# Patient Record
Sex: FEMALE | Race: WHITE | Employment: FULL TIME | URBAN - METROPOLITAN AREA
[De-identification: names, ages, dates, MRNs, and addresses within clinical notes are randomized per-mention and may not be internally consistent; named-entity substitution may affect disease eponyms.]

---

## 2018-07-11 ENCOUNTER — OFFICE VISIT (OUTPATIENT)
Dept: FAMILY MEDICINE CLINIC | Facility: CLINIC | Age: 23
End: 2018-07-11

## 2018-07-11 VITALS
OXYGEN SATURATION: 98 % | RESPIRATION RATE: 16 BRPM | HEART RATE: 79 BPM | WEIGHT: 125 LBS | HEIGHT: 61 IN | TEMPERATURE: 99 F | DIASTOLIC BLOOD PRESSURE: 76 MMHG | SYSTOLIC BLOOD PRESSURE: 114 MMHG | BODY MASS INDEX: 23.6 KG/M2

## 2018-07-11 DIAGNOSIS — Z00.00 NORMAL PHYSICAL EXAM: Primary | ICD-10-CM

## 2018-07-11 DIAGNOSIS — R31.9 HEMATURIA, UNSPECIFIED TYPE: ICD-10-CM

## 2018-07-11 LAB
APPEARANCE: CLEAR
MULTISTIX LOT#: NORMAL NUMERIC
PH, URINE: 6.5 (ref 4.5–8)
SPECIFIC GRAVITY: 1.02 (ref 1–1.03)
URINE-COLOR: YELLOW
UROBILINOGEN,SEMI-QN: 0.2 MG/DL (ref 0–1.9)

## 2018-07-11 PROCEDURE — 99202 OFFICE O/P NEW SF 15 MIN: CPT | Performed by: NURSE PRACTITIONER

## 2018-07-11 PROCEDURE — 81003 URINALYSIS AUTO W/O SCOPE: CPT | Performed by: NURSE PRACTITIONER

## 2018-07-11 RX ORDER — LEVONORGESTREL AND ETHINYL ESTRADIOL AND ETHINYL ESTRADIOL 150-30(84)
KIT ORAL
Refills: 0 | COMMUNITY
Start: 2018-07-09 | End: 2019-10-04 | Stop reason: ALTCHOICE

## 2018-07-11 NOTE — PROGRESS NOTES
CHIEF COMPLAINT:   Patient presents with:  UTI: blood in urine x 1 day     HPI:   Regina Rice is a 23year old female who presents with symptoms of hematuria for last 1 days.  Pt is on her period and saw more blood than normal. She was on birth contr Size: adult)   Pulse 79   Temp 98.6 °F (37 °C) (Oral)   Resp 16   Ht 61\"   Wt 125 lb   LMP 07/05/2018   SpO2 98%   BMI 23.62 kg/m²   GENERAL: well developed, well nourished,in no apparent distress  CARDIO: RRR, no murmurs  LUNGS: clear to ausculation bila

## 2018-07-12 NOTE — PATIENT INSTRUCTIONS
Blood in the Urine    Blood in the urine (hematuria) has many possible causes. If it occurs after an injury (such as a car accident or fall), it is most often a sign of bruising to the kidney or bladder.  Common causes of blood in the urine include urinar · Fever of 100.4ºF (38ºC) or higher, or as directed by your healthcare provider  · Repeated vomiting  · Bleeding from the nose or gums or easy bruising  Date Last Reviewed: 9/1/2016  © 0495-4401 [employer] [address].  [address] [place], [place] Both types of hematuria can have the same causes. Neither one is necessarily more serious than the other. With either type, you may have other symptoms, such as pain, pressure, or burning when you urinate, abdominal pain, or back pain.  Or, you may not have

## 2019-10-04 PROBLEM — G43.109 MIGRAINE WITH AURA: Status: ACTIVE | Noted: 2019-10-04

## 2019-10-04 PROBLEM — Z82.49 FAMILY HISTORY OF BRAIN ANEURYSM: Status: ACTIVE | Noted: 2019-10-04

## 2019-10-04 PROBLEM — G43.009 MIGRAINE WITHOUT AURA: Status: ACTIVE | Noted: 2019-10-04

## 2019-12-20 ENCOUNTER — OFFICE VISIT (OUTPATIENT)
Dept: FAMILY MEDICINE CLINIC | Facility: CLINIC | Age: 24
End: 2019-12-20

## 2019-12-20 VITALS
BODY MASS INDEX: 22.62 KG/M2 | HEIGHT: 61 IN | RESPIRATION RATE: 20 BRPM | OXYGEN SATURATION: 98 % | WEIGHT: 119.81 LBS | DIASTOLIC BLOOD PRESSURE: 62 MMHG | TEMPERATURE: 99 F | SYSTOLIC BLOOD PRESSURE: 98 MMHG | HEART RATE: 87 BPM

## 2019-12-20 DIAGNOSIS — R10.816 EPIGASTRIC ABDOMINAL TENDERNESS WITHOUT REBOUND TENDERNESS: Primary | ICD-10-CM

## 2019-12-20 PROCEDURE — 99213 OFFICE O/P EST LOW 20 MIN: CPT | Performed by: NURSE PRACTITIONER

## 2019-12-20 NOTE — PATIENT INSTRUCTIONS
Tips to Control Acid Reflux    To control acid reflux, you’ll need to make some basic diet and lifestyle changes. The simple steps outlined below may be all you’ll need to ease discomfort. Watch what you eat  · Avoid fatty foods and spicy foods.   · Eat · Talk to your healthcare provider if you are taking any of the following medicines. These medicines can make GERD symptoms worse:  ? Calcium channel blockers  ? Theophylline  ?  Anticholinergic medicines, such as oxybutynin and benzatropine  · Begin an exe The esophagus is a tube that carries food from the mouth to the stomach. A valve (the LES, lower esophageal sphincter) at the lower end of the esophagus prevents stomach acid from flowing upward.  When this valve doesn't work properly, stomach contents may · If your symptoms occur during sleep, use a foam wedge to elevate your upper body (not just your head.) Or, place 4\" blocks under the head of your bed. Or use 2 bed risers under your bedframe.   Medicines  If needed, medicines can help relieve the symptom © 6495-4927 [employer] [address]. [address] [facility], [address] [place]. All rights reserved. This information is not intended as a substitute for professional medical care. Always follow your healthcare professional's instructions.         What Is The following foods tend to cause problems for people diagnosed with GERD:  · Tomatoes and tomato products  · Alcohol  · Coffee  · Peppermint  · Greasy or spicy foods  Talk with your provider if you don’t understand how to make the dietary changes needed t Date Last Reviewed: 10/1/2016  © 8610-8760 [employer] [address]. [address] [facility], [address] [place]. All rights reserved. This information is not intended as a substitute for professional medical care.  Always follow your healthcare professional

## 2019-12-20 NOTE — PROGRESS NOTES
CHIEF COMPLAINT:   Patient presents with:  Abdominal Pain: left side pain,  x2days        HPI:   Sea Melgar is a 30year old female who presents for complaints of abdominal pain. Symptoms have been present for 1  days.   Began @ 24 hours ago megan (Oral)   Resp 20   Ht 61\"   Wt 119 lb 12.8 oz (54.3 kg)   LMP 11/29/2019   SpO2 98%   BMI 22.64 kg/m²   GENERAL: well developed, well nourished,in no apparent distress  SKIN: no rashes,no suspicious lesions  HEAD: atraumatic, normocephalic,   EYES: conjun

## 2020-12-04 ENCOUNTER — OFFICE VISIT (OUTPATIENT)
Dept: FAMILY MEDICINE CLINIC | Facility: CLINIC | Age: 25
End: 2020-12-04

## 2020-12-04 ENCOUNTER — LAB ENCOUNTER (OUTPATIENT)
Dept: LAB | Age: 25
End: 2020-12-04
Attending: PHYSICIAN ASSISTANT
Payer: COMMERCIAL

## 2020-12-04 VITALS
TEMPERATURE: 98 F | DIASTOLIC BLOOD PRESSURE: 68 MMHG | OXYGEN SATURATION: 97 % | HEART RATE: 86 BPM | SYSTOLIC BLOOD PRESSURE: 102 MMHG | HEIGHT: 63 IN | WEIGHT: 112 LBS | BODY MASS INDEX: 19.84 KG/M2 | RESPIRATION RATE: 22 BRPM

## 2020-12-04 DIAGNOSIS — Z30.09 COUNSELING FOR BIRTH CONTROL REGARDING INTRAUTERINE DEVICE (IUD): ICD-10-CM

## 2020-12-04 DIAGNOSIS — Z00.00 ROUTINE GENERAL MEDICAL EXAMINATION AT A HEALTH CARE FACILITY: Primary | ICD-10-CM

## 2020-12-04 DIAGNOSIS — Z13.21 ENCOUNTER FOR VITAMIN DEFICIENCY SCREENING: ICD-10-CM

## 2020-12-04 DIAGNOSIS — I67.1 BRAIN ANEURYSM: ICD-10-CM

## 2020-12-04 DIAGNOSIS — Z00.00 ROUTINE GENERAL MEDICAL EXAMINATION AT A HEALTH CARE FACILITY: ICD-10-CM

## 2020-12-04 DIAGNOSIS — R51.9 HEADACHE DISORDER: ICD-10-CM

## 2020-12-04 PROCEDURE — 36415 COLL VENOUS BLD VENIPUNCTURE: CPT

## 2020-12-04 PROCEDURE — 82306 VITAMIN D 25 HYDROXY: CPT

## 2020-12-04 PROCEDURE — 3078F DIAST BP <80 MM HG: CPT | Performed by: PHYSICIAN ASSISTANT

## 2020-12-04 PROCEDURE — 84443 ASSAY THYROID STIM HORMONE: CPT

## 2020-12-04 PROCEDURE — 85025 COMPLETE CBC W/AUTO DIFF WBC: CPT

## 2020-12-04 PROCEDURE — 80053 COMPREHEN METABOLIC PANEL: CPT

## 2020-12-04 PROCEDURE — 3074F SYST BP LT 130 MM HG: CPT | Performed by: PHYSICIAN ASSISTANT

## 2020-12-04 PROCEDURE — 3008F BODY MASS INDEX DOCD: CPT | Performed by: PHYSICIAN ASSISTANT

## 2020-12-04 PROCEDURE — 99395 PREV VISIT EST AGE 18-39: CPT | Performed by: PHYSICIAN ASSISTANT

## 2020-12-04 PROCEDURE — 80061 LIPID PANEL: CPT

## 2020-12-04 PROCEDURE — 82607 VITAMIN B-12: CPT

## 2020-12-04 NOTE — PROGRESS NOTES
HPI:    Patient ID: David Perez is a 30year old female. HPI  Patient presents today to establish care. Was previously receiving care at a private practice and her doctor unfortunately passed away.      PMHx: Brain aneurysm, migraines  PSHx: gumaro Refill   • Multiple Vitamins-Minerals (MULTI-VITAMIN/MINERALS) Oral Tab Take 1 tablet by mouth daily.      • ferrous sulfate 325 (65 FE) MG Oral Tab EC Take 325 mg by mouth daily with breakfast.       Allergies:No Known Allergies   PHYSICAL EXAM:   Physical normal.              ASSESSMENT/PLAN:   1. Routine general medical examination at a health care facility  Physical exam is unremarkable. Check fasting labs. Health maintenance issues discussed. Encouraged routine vaccines.   Advised healthy diet and regul

## 2021-02-19 ENCOUNTER — HOSPITAL ENCOUNTER (OUTPATIENT)
Dept: MRI IMAGING | Age: 26
Discharge: HOME | End: 2021-02-19
Attending: PHYSICIAN ASSISTANT
Payer: COMMERCIAL

## 2021-02-19 DIAGNOSIS — D35.2 PITUITARY MICROADENOMA (HCC): ICD-10-CM

## 2021-02-19 DIAGNOSIS — I67.1 BRAIN ANEURYSM: ICD-10-CM

## 2021-02-19 PROCEDURE — A9575 INJ GADOTERATE MEGLUMI 0.1ML: HCPCS | Performed by: PHYSICIAN ASSISTANT

## 2021-02-19 PROCEDURE — 70553 MRI BRAIN STEM W/O & W/DYE: CPT | Performed by: PHYSICIAN ASSISTANT

## 2021-03-13 DIAGNOSIS — Z23 NEED FOR VACCINATION: ICD-10-CM

## 2021-04-23 ENCOUNTER — TELEMEDICINE (OUTPATIENT)
Dept: FAMILY MEDICINE CLINIC | Facility: CLINIC | Age: 26
End: 2021-04-23

## 2021-04-23 DIAGNOSIS — N94.6 SEVERE MENSTRUAL CRAMPS: Primary | ICD-10-CM

## 2021-04-23 DIAGNOSIS — N92.0 MENORRHAGIA WITH REGULAR CYCLE: ICD-10-CM

## 2021-04-23 PROCEDURE — 99213 OFFICE O/P EST LOW 20 MIN: CPT | Performed by: PHYSICIAN ASSISTANT

## 2021-04-23 RX ORDER — LEVONORGESTREL / ETHINYL ESTRADIOL AND ETHINYL ESTRADIOL 150-30(84)
1 KIT ORAL DAILY
Qty: 84 TABLET | Refills: 3 | Status: SHIPPED | OUTPATIENT
Start: 2021-04-23 | End: 2021-07-01

## 2021-04-23 NOTE — PROGRESS NOTES
Video Visit    Mario White is a 30year old female. No chief complaint on file. HPI:   Patient presents today to discuss her options for birth control.   In the past she was under the impression she had a brain aneurysm and was told to stay off pain, joint pain, neck pain, and myalgias. NEURO: Denies numbness/tingling, weakness, and headaches        EXAM:   GENERAL: well developed, well nourished, NAD. HEENT: AT/NC. Normal lips, gums, teeth, tongue.    LUNGS: Speaking in complete sentences comfo patient was also advised of the potential privacy & security concerns related to the telehealth platform.    The patient was made aware of where to find [facility] notice of privacy practices, telehealth consent form and other related consent forms and documents

## 2021-07-01 ENCOUNTER — PATIENT MESSAGE (OUTPATIENT)
Dept: FAMILY MEDICINE CLINIC | Facility: CLINIC | Age: 26
End: 2021-07-01

## 2021-07-01 DIAGNOSIS — D36.7 DERMOID CYST OF UPPER EXTREMITY, UNSPECIFIED LATERALITY: Primary | ICD-10-CM

## 2021-07-01 DIAGNOSIS — N94.6 SEVERE MENSTRUAL CRAMPS: ICD-10-CM

## 2021-07-01 DIAGNOSIS — N92.0 MENORRHAGIA WITH REGULAR CYCLE: ICD-10-CM

## 2021-07-01 RX ORDER — LEVONORGESTREL / ETHINYL ESTRADIOL AND ETHINYL ESTRADIOL 150-30(84)
1 KIT ORAL DAILY
Qty: 84 TABLET | Refills: 0 | Status: SHIPPED | OUTPATIENT
Start: 2021-07-01 | End: 2021-09-29

## 2021-07-01 NOTE — TELEPHONE ENCOUNTER
From: Merle Angel  To: Haritha Molina PA-C  Sent: 7/1/2021 11:35 AM CDT  Subject: Referral Request    Osito Kearney I hope all is well, I was reaching out because I was going to see if I was able to request another 3 month dose of my birth control.  I

## 2021-07-01 NOTE — TELEPHONE ENCOUNTER
Monique Gamble, please see message from pt, okay to refill BCP for another 3 months until her insurance kicks in and referral to Derm?

## 2021-09-29 DIAGNOSIS — N92.0 MENORRHAGIA WITH REGULAR CYCLE: ICD-10-CM

## 2021-09-29 DIAGNOSIS — N94.6 SEVERE MENSTRUAL CRAMPS: ICD-10-CM

## 2021-09-29 RX ORDER — LEVONORGESTREL / ETHINYL ESTRADIOL AND ETHINYL ESTRADIOL 150-30(84)
KIT ORAL
Qty: 91 TABLET | Refills: 0 | Status: SHIPPED | OUTPATIENT
Start: 2021-09-29 | End: 2022-02-03

## 2021-12-08 ENCOUNTER — OFFICE VISIT (OUTPATIENT)
Dept: FAMILY MEDICINE CLINIC | Facility: CLINIC | Age: 26
End: 2021-12-08
Payer: COMMERCIAL

## 2021-12-08 DIAGNOSIS — Z53.29 NO-SHOW FOR APPOINTMENT: Primary | ICD-10-CM

## 2022-02-03 ENCOUNTER — OFFICE VISIT (OUTPATIENT)
Dept: FAMILY MEDICINE CLINIC | Facility: CLINIC | Age: 27
End: 2022-02-03
Payer: COMMERCIAL

## 2022-02-03 VITALS
DIASTOLIC BLOOD PRESSURE: 68 MMHG | SYSTOLIC BLOOD PRESSURE: 104 MMHG | TEMPERATURE: 98 F | WEIGHT: 117 LBS | OXYGEN SATURATION: 99 % | HEIGHT: 62.5 IN | HEART RATE: 70 BPM | BODY MASS INDEX: 20.99 KG/M2 | RESPIRATION RATE: 18 BRPM

## 2022-02-03 DIAGNOSIS — Z12.4 CERVICAL CANCER SCREENING: ICD-10-CM

## 2022-02-03 DIAGNOSIS — N94.6 SEVERE MENSTRUAL CRAMPS: ICD-10-CM

## 2022-02-03 DIAGNOSIS — N92.0 MENORRHAGIA WITH REGULAR CYCLE: ICD-10-CM

## 2022-02-03 DIAGNOSIS — Z00.00 ROUTINE GENERAL MEDICAL EXAMINATION AT A HEALTH CARE FACILITY: Primary | ICD-10-CM

## 2022-02-03 PROCEDURE — 3078F DIAST BP <80 MM HG: CPT | Performed by: PHYSICIAN ASSISTANT

## 2022-02-03 PROCEDURE — 3074F SYST BP LT 130 MM HG: CPT | Performed by: PHYSICIAN ASSISTANT

## 2022-02-03 PROCEDURE — 99395 PREV VISIT EST AGE 18-39: CPT | Performed by: PHYSICIAN ASSISTANT

## 2022-02-03 PROCEDURE — 88175 CYTOPATH C/V AUTO FLUID REDO: CPT | Performed by: PHYSICIAN ASSISTANT

## 2022-02-03 PROCEDURE — 3008F BODY MASS INDEX DOCD: CPT | Performed by: PHYSICIAN ASSISTANT

## 2022-02-03 RX ORDER — LEVONORGESTREL / ETHINYL ESTRADIOL AND ETHINYL ESTRADIOL 150-30(84)
1 KIT ORAL DAILY
Qty: 91 TABLET | Refills: 3 | Status: SHIPPED | OUTPATIENT
Start: 2022-02-03

## 2022-04-28 ENCOUNTER — PATIENT MESSAGE (OUTPATIENT)
Dept: FAMILY MEDICINE CLINIC | Facility: CLINIC | Age: 27
End: 2022-04-28

## 2022-04-28 RX ORDER — LEVONORGESTREL / ETHINYL ESTRADIOL AND ETHINYL ESTRADIOL 150-30(84)
1 KIT ORAL DAILY
Qty: 91 TABLET | Refills: 3 | Status: SHIPPED | OUTPATIENT
Start: 2022-04-28

## 2022-04-28 RX ORDER — LEVONORGESTREL / ETHINYL ESTRADIOL AND ETHINYL ESTRADIOL 150-30(84)
1 KIT ORAL DAILY
Qty: 91 TABLET | Refills: 3 | Status: SHIPPED | OUTPATIENT
Start: 2022-04-28 | End: 2022-04-28

## 2022-04-28 NOTE — TELEPHONE ENCOUNTER
Rx Request  Levonorgest-Eth Estrad 91-Day 0.15-0.03 &0.01 MG Oral Tab    Disp:      91              R: 3    Associated Dx: Menorrhagia with regular cycle, Severe menstrual cramps     Last Refilled: 02/03/2022    Last Visit: 02/03/2022    Protocol Passed?  Yes[ x ]       No[  ]

## 2022-04-29 NOTE — TELEPHONE ENCOUNTER
From: [place]  To: Oksana Shaffer PA-C  Sent: 4/28/2022 7:24 PM CDT  Subject: Birth Control     Hello I made a refill request and went to the [facility] when it was ready. However the pharmacist said there was an issue with insurance only covering 60 days but the pack is 90. She said they sent an alert to the office but to also reach out. Thank you !

## 2022-05-04 ENCOUNTER — TELEPHONE (OUTPATIENT)
Dept: FAMILY MEDICINE CLINIC | Facility: CLINIC | Age: 27
End: 2022-05-04

## 2022-05-18 ENCOUNTER — TELEPHONE (OUTPATIENT)
Dept: FAMILY MEDICINE CLINIC | Facility: CLINIC | Age: 27
End: 2022-05-18

## 2022-07-02 DIAGNOSIS — N94.6 SEVERE MENSTRUAL CRAMPS: ICD-10-CM

## 2022-07-02 DIAGNOSIS — N92.0 MENORRHAGIA WITH REGULAR CYCLE: ICD-10-CM

## 2022-07-05 RX ORDER — LEVONORGESTREL / ETHINYL ESTRADIOL AND ETHINYL ESTRADIOL 150-30(84)
KIT ORAL
Qty: 91 TABLET | Refills: 3 | Status: SHIPPED | OUTPATIENT
Start: 2022-07-05

## (undated) DIAGNOSIS — N94.6 SEVERE MENSTRUAL CRAMPS: ICD-10-CM

## (undated) DIAGNOSIS — N92.0 MENORRHAGIA WITH REGULAR CYCLE: ICD-10-CM